# Patient Record
Sex: MALE | Race: AMERICAN INDIAN OR ALASKA NATIVE
[De-identification: names, ages, dates, MRNs, and addresses within clinical notes are randomized per-mention and may not be internally consistent; named-entity substitution may affect disease eponyms.]

---

## 2019-01-17 ENCOUNTER — HOSPITAL ENCOUNTER (EMERGENCY)
Dept: HOSPITAL 97 - ER | Age: 49
Discharge: HOME | End: 2019-01-17
Payer: COMMERCIAL
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
## 2019-01-17 DIAGNOSIS — S46.819A: Primary | ICD-10-CM

## 2019-01-17 DIAGNOSIS — S39.012A: ICD-10-CM

## 2019-01-17 PROCEDURE — 72170 X-RAY EXAM OF PELVIS: CPT

## 2019-01-17 PROCEDURE — 72100 X-RAY EXAM L-S SPINE 2/3 VWS: CPT
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
